# Patient Record
Sex: MALE | Race: BLACK OR AFRICAN AMERICAN | NOT HISPANIC OR LATINO | Employment: PART TIME | URBAN - METROPOLITAN AREA
[De-identification: names, ages, dates, MRNs, and addresses within clinical notes are randomized per-mention and may not be internally consistent; named-entity substitution may affect disease eponyms.]

---
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
## 2022-12-14 DIAGNOSIS — R09.89 DECREASED CARDIAC FUNCTION: ICD-10-CM

## 2022-12-14 DIAGNOSIS — I42.8 LV NON-COMPACTION CARDIOMYOPATHY: Primary | ICD-10-CM

## 2022-12-14 DIAGNOSIS — R94.31 ABNORMAL EKG: ICD-10-CM

## 2023-01-04 ENCOUNTER — OFFICE VISIT (OUTPATIENT)
Dept: PEDIATRIC CARDIOLOGY | Facility: CLINIC | Age: 23
End: 2023-01-04
Payer: MEDICAID

## 2023-01-04 VITALS
HEART RATE: 74 BPM | HEIGHT: 69 IN | RESPIRATION RATE: 16 BRPM | SYSTOLIC BLOOD PRESSURE: 124 MMHG | BODY MASS INDEX: 27.13 KG/M2 | WEIGHT: 183.19 LBS | DIASTOLIC BLOOD PRESSURE: 68 MMHG | OXYGEN SATURATION: 99 %

## 2023-01-04 DIAGNOSIS — R93.1 ABNORMAL ECHOCARDIOGRAM: ICD-10-CM

## 2023-01-04 DIAGNOSIS — Z82.49 FAMILY HISTORY OF CARDIOMYOPATHY: Primary | ICD-10-CM

## 2023-01-04 DIAGNOSIS — Z82.49 FAMILY HISTORY OF AICD (AUTOMATIC INTERNAL CARDIAC DEFIBRILLATOR): ICD-10-CM

## 2023-01-04 DIAGNOSIS — R94.31 ABNORMAL EKG: ICD-10-CM

## 2023-01-04 DIAGNOSIS — I35.1 AORTIC VALVE INSUFFICIENCY, ETIOLOGY OF CARDIAC VALVE DISEASE UNSPECIFIED: ICD-10-CM

## 2023-01-04 DIAGNOSIS — I42.8 LV NON-COMPACTION CARDIOMYOPATHY: ICD-10-CM

## 2023-01-04 DIAGNOSIS — Z91.89 AT HIGH RISK FOR CARDIAC ARRHYTHMIA: ICD-10-CM

## 2023-01-04 DIAGNOSIS — R09.89 DECREASED CARDIAC FUNCTION: ICD-10-CM

## 2023-01-04 PROCEDURE — 93000 EKG 12-LEAD: ICD-10-PCS | Mod: S$GLB,,, | Performed by: PEDIATRICS

## 2023-01-04 PROCEDURE — 99204 OFFICE O/P NEW MOD 45 MIN: CPT | Mod: 25,S$GLB,, | Performed by: PHYSICIAN ASSISTANT

## 2023-01-04 PROCEDURE — 93000 ELECTROCARDIOGRAM COMPLETE: CPT | Mod: S$GLB,,, | Performed by: PEDIATRICS

## 2023-01-04 PROCEDURE — 99204 PR OFFICE/OUTPT VISIT, NEW, LEVL IV, 45-59 MIN: ICD-10-PCS | Mod: 25,S$GLB,, | Performed by: PHYSICIAN ASSISTANT

## 2023-01-04 NOTE — PROGRESS NOTES
[facility]  Daren Silva  2000    Daren Silva is a 23 y.o. 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   Daren is here today unaccompanied.  He is late for follow up. He is considered a new patient for billing purposes.     HPI  Daren Silva reports he was having a physical for work and was noted to have elevated BP so decided he wanted to return for follow up over 5 years late.     Daren Silva was initially sent for cardiac evaluation in January 2014 for abnormal echo findings following a sports physical. Echo revealed LV noncompaction. Of note, his mother has a history dilated cardiomyopathy associated with eccentric hypertrophy and an ejection fraction of 10% per echocardiogram report from 6/25/10. She had ventricular tachycardia s/p defibrillator/pacemaker. She was initially diagnosed in 2008 after fainting in [place]. Since his last visit, his mother has passed away.  His MGM also reported passed from similar issues.     His studies had been evaluated by several cardiologists (Dr. Samano, Dr. Clarke, Dr. Gabreil, Dr. Yuen, Dr. Forde) and all agreed that he has LV non-compaction. At that time, he was living part time with his father in [place] who wanted him to play sports. He was last seen at [facility] in 2016 where is was again restricted from sports. He reports he continued to play football without any issues.     In 2017 he underwent cardiac MRI at [facility] main campus that showed non-compacted to compacted myocardium ratio > 2.3 with multiple crypts consistent with LV non-compaction with LVEF of  40 %. Stress echo at that time showed moderately depressed left ventricular systolic function (EF 30-35%) but no evidence of stress induced myocardial ischemia. He was discussed at the Pediatric CV Surgery and Cardiology Conference. The consensus from the group, pertaining to his  asymptomatic heart failure, is that he should be on an ACE and beta blocker. Also recommended another echo  to revaluate his function, since there was such a discrepancy between his echo, MRI, and stress echo. Recommended no sports or competitive activities.  He saw Dr. Yuen in 2017 who planned to consider ICD for primary prevention. He has not been seen by any cardiologist since 2017. He reports he stopped Enalapril, Atenolol, and ASA around that time.       Daren has been doing well since last visit.  Daren has a lot of energy and does not get short of breath with activity.   Denies any recent illness, surgeries, or hospitalizations.    There are no reports of chest pain, chest pain with exertion, cyanosis, exercise intolerance, dyspnea, fatigue, feeding intolerance, palpitations, syncope, and tachypnea. No other cardiovascular or medical concerns are reported.      Medications:   Medication List with Changes/Refills   Discontinued Medications    ATENOLOL (TENORMIN) 25 MG TABLET    Take 1/4 tablet (6.25mg) by mouth twice a day.    LISINOPRIL 10 MG TAB    Take 1 tablet (10 mg total) by mouth once daily.      Allergies: Review of patient's allergies indicates:  No Known Allergies  Family History   Problem Relation Age of Onset    Early death Mother     Pacemaker/defibrilator Mother     Hypertension Mother     Arrhythmia Mother         V-tach    Heart failure Mother     Stroke Mother     No Known Problems Father     No Known Problems Sister     No Known Problems Brother     No Known Problems Brother     Heart attacks under age 50 Maternal Grandmother          of heart attack at 41    Cancer Maternal Grandfather     No Known Problems Paternal Grandmother     No Known Problems Paternal Grandfather     Anemia Neg Hx     Cardiomyopathy Neg Hx     Clotting disorder Neg Hx     Childhood respiratory disease Neg Hx     Congenital heart disease Neg Hx     Deafness Neg Hx     Long QT syndrome Neg Hx     Premature  birth Neg Hx     Seizures Neg Hx     SIDS Neg Hx      Past Medical History:   Diagnosis Date    Abnormal EKG     LVH is suggested. QTc measured 408msec in V5 and 453 in lead II; Average QTc 430msec & WNL for age. Abnormal infero-lateral T waves noted with diffuse changes in all leads    Chlamydia trachomatis infection 01/15/2020    Decreased cardiac function     Gonorrhea 01/23/2021    Hypertension     LV non-compaction cardiomyopathy      Social History     Social History Narrative    Daren is currently working. Daren likes to chill and play video games.      Past Surgical History:   Procedure Laterality Date    NO PAST SURGERIES       Birth History    Birth     Weight: 2.268 kg (5 lb)    Gestation Age: 37 wks    Days in Hospital: 2.0       There is no immunization history on file for this patient.  Immunizations were reviewed today and if not current, recommend follow up with the PCP for further management.  Past medical history, family history, surgical history, social history updated and reviewed today.     Review of Systems  GENERAL: No fever, chills, fatigability, malaise, or weight loss.  CHEST: Denies SEXTON, cyanosis, wheezing, cough, sputum production, or SOB.  CARDIOVASCULAR: Denies chest pain, palpitations, diaphoresis, SOB, or reduced exercise tolerance.  Endocrine: Denies polyphagia, polydipsia, or polyuria  Skin: Denies rashes or color change  HENT: Negative for congestion, headaches and sore throat.   ABDOMEN: Appetite fine. No weight loss. Denies diarrhea, abdominal pain, nausea, or vomiting.  PERIPHERAL VASCULAR: No edema, varicosities, or cyanosis.  Musculoskeletal: Negative for muscle weakness and stiffness.  NEUROLOGIC: no dizziness, no history of syncope by report, no headache   Psychiatric/Behavioral: Negative for altered mental status. The patient is not nervous/anxious.   Allergic/Immunologic: Negative for environmental allergies.   : dysuria, hematuria, polyuria    Objective:   /68  "(BP Location: Right arm, Patient Position: Sitting, BP Method: X-Large (Manual))   Pulse 74   Resp 16   Ht 5' 9" (1.753 m)   Wt 83.1 kg (183 lb 3.2 oz)   SpO2 99%   BMI 27.05 kg/m²   Body surface area is 2.01 meters squared.  Growth percentile SmartLinks can only be used for patients less than 20 years old.    Physical Exam  GENERAL: Awake, well-developed well-nourished, no apparent distress  HEENT: mucous membranes moist and pink, normocephalic, no cranial or carotid bruits, sclera anicteric  NECK:  no lymphadenopathy  CHEST: Good air movement, clear to auscultation bilaterally  CARDIOVASCULAR: Quiet precordium, regular rate and rhythm, single S1, split S2, normal P2, No S3 or S4, no rubs or gallops. No clicks or rumbles. No cardiomegaly by palpation. No murmur noted. ABDOMEN: Soft, nontender nondistended, no hepatosplenomegaly, no aortic bruits  EXTREMITIES: Warm well perfused, 2+ radial/pedal/femoral pulses, capillary refill 2 seconds, no clubbing, cyanosis, or edema  NEURO: Alert and oriented, cooperative with exam, face symmetric, moves all extremities well.  Skin: pink, turgor WNL  Vitals reviewed     Tests:   Today's EKG interpretation by Dr. Crowell reveals:   NSR  Nonspecific T wave changes  No LVH  Abnormal EKG  (Final report in electronic medical record)    Holter 11/06/2017   Holter: PREDOMINANT RHYTHM  1. Sinus rhythm with heart rates varying between 54 and 173 bpm with an average of 95 bpm.   VENTRICULAR ARRHYTHMIAS  1. There were very rare PVCs recorded totalling 8 and averaging less than 1 per hour.   2. There were no episodes of ventricular tachycardia.  SUPRA VENTRICULAR ARRHYTHMIAS  1. There was no supraventricular ectopic activity.  SINUS NODE FUNCTION  1. There was no evidence of high grade SA kristie block.   AV CONDUCTION  1. There was no evidence of high grade AV block.      Stress ECHO: 9/7/17  CONCLUSIONS     1 - Moderately depressed left ventricular systolic function (EF 30-35%).     2 - " Normal right ventricular systolic function .     3 - Indeterminate LV diastolic function.     4 - Noncompaction cardiomyopathy.   No evidence of stress induced myocardial ischemia.      Cardiac MRI: 9/15/17   Conclusion:    1. The left ventricular volumes are increased. Non-compacted to compacted myocardium ratio > 2.3 with multiple crypts consistent with LV non-compaction. Mild global hypokinesis. The calculated LVEF is 40 %.   2. The right ventricular end diastolic volume is increased.  RVEF of 39 %.   3. Top normal left atrial size.   4. Trivial aortic valve insufficiency, regurgitant fraction of 1%.     5. Negative delayed hyperenhancement.       8/24/17 echo  History of mild LV non-compaction???. There are 4 chambers with normally aligned great vessels. There are no shunts noted. The right coronary artery and left coronary are patent by 2D. Physiological TR, PI. Mildly decreased LV function LVID qualitatively increased with prominent trabeculation in LV apex and lateral wall. LA volume normal FS ~ 30% EF by Magdaleno's ~ 49 , 50% LVEF (Teich) 57% MV E/A 1.7 LV tissue doppler data is normal TAPSE 25 mms Clinical Correlation Suggested Follow Up Warranted     Assessment:  Patient Active Problem List   Diagnosis    LV non-compaction cardiomyopathy    Decreased cardiac function    Abnormal EKG    Family history of AICD (automatic internal cardiac defibrillator)    Family history of cardiomyopathy    Abnormal echocardiogram    At high risk for cardiac arrhythmia    Aortic valve regurgitation     Discussion/ Plan:   Dr. Crowell reviewed history and physical exam. He then performed the physical exam. He discussed the findings with the patient's caregiver(s), and answered all questions. Dr. Crowell and I have reviewed our general guidelines related to cardiac issues with the family.  I instructed them in the event of an emergency to call 911 or go to the nearest emergency room.  They know to contact the PCP if problems arise or  if they are in doubt.    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 AI noted on MRI.  There has been a lapse in care from 2017 when he stopped his cardiac medications. At his visit here he reported he had lost his wallet with his ID and insurance info.  Because of very limited echo availably, unsure of insurance coverage, and since it has been over 5 years since his last echo, Dr. Crowell did a limited echo at no charge   with him at the bedside to check his LVEF to make sure he did not need to be urgently admitted  to the hospital. His LVEF is decreased but stable from previous echo and was about 48%. He wanted to come back Friday for an echo, holter, and genetic testing once he was able to obtain his insurance info and obtain a new ID.  Spoke to him Thursday.  He had insurance through his dad until November reportedly. He was going to call his work and see if he had insurance through his job. He was to call back before Friday to discuss plan. He called back Thursday and said his insurance through work would kick in sometime in Feb 2023. Dr. Crowell was agreeable with pushing back testing until early Feb 2023 as long as he was asymptomatic. Daren said he would be unable to pay the 2000 dollars he was quoted  for his echo through billing department and had talked to the financial assistance through [facility]. He wanted to wait until his insurance was activated. He was to call us let us know what day it was active. Tried to reach him several times Friday morning before is scheduled echo appointment. No answer. Spoke him around 11 am on Friday. Daren and his father are now working for a new company and Daren states he does not have health insurance through work. His dad is calling now to see if he can be added to his insurance. Kiera also suggested applying for medicaid and provided the number. Daren would like to plan for  an echo, holter, apt, and genetic testing Feb 1st 2023. If he is unable to obtain insurance before then he is going to talk to the financial department again about payment plans. He is too old for [facility] unfortunately. Discussed that if he develops any symptoms, he needs to alert us ASAP. If he is in distress, he needs to be seen in the ER. Dr. Crowell had a long discussion about LV noncompaction cardiomyopathy and he needs lifelong monitoring. He will need to see Dr. Johnson and EP and need further testing. Daren expressed understanding. Discussed we will be happy to help in way and Daren will keep us updated.      Activity:NO strenuous activities, no heavy weight lifting, no competitive sports/activities.        No endocarditis prophylaxis is recommended in this circumstance.      Medications:   Medication List with Changes/Refills   Discontinued Medications    ATENOLOL (TENORMIN) 25 MG TABLET    Take 1/4 tablet (6.25mg) by mouth twice a day.    LISINOPRIL 10 MG TAB    Take 1 tablet (10 mg total) by mouth once daily.         Orders placed this encounter  Orders Placed This Encounter   Procedures    3-14 Day Pediatric Holter Monitor    EKG 12-lead    Pediatric Echo Limited Echo? No       Follow-Up:   Return to clinic Feb 1, 2023 or sooner if there are any concerns    Sincerely,  Sesar Crowell MD    Note Contributing Authors:  MD Swathi Urbina PA-C  01/06/2023    Attestation: Sesar Crowell MD  I have reviewed the records and agree with the above. I have examined the patient and discussed the findings with the family in attendance. All questions were answered to their satisfaction. I agree with the plan and the follow up instructions.

## 2023-01-04 NOTE — LETTER
January 6, 2023        Tom Blevins MD  [address] [facility] [id number] [place] [address] Cardiology  [address] [facility] [address]  Phone: [phone]  Fax: [phone]   Patient: Daren Silva   MR Number: [id number]   YOB: 2000   Date of Visit: 1/4/2023       Dear Dr. Blevins:    Thank you for referring Daren Silva to me for evaluation. Attached you will find relevant portions of my assessment and plan of care.    If you have questions, please do not hesitate to call me. I look forward to following Daren Silva along with you.    Sincerely,      Swathi Johnson PA-C            CC    No Recipients    Enclosure

## 2023-01-04 NOTE — PATIENT INSTRUCTIONS
Sesar Crowell MD  Pediatric Cardiology  [address] [place] [id number]  [phone]    General Guidelines    Name: Daren Silva                   : 2000    Diagnosis:   1. LV non-compaction cardiomyopathy    2. Decreased cardiac function    3. Abnormal EKG        PCP: Tom Blevins MD  PCP Phone Number: [phone]    If you have an emergency or you think you have an emergency, go to the nearest emergency room!     Breathing too fast, doesnt look right, consistently not eating well, your child needs to be checked. These are general indications that your child is not feeling well. This may be caused by anything, a stomach virus, an ear ache or heart disease, so please call Tom Blevins MD. If Tom Blevins MD thinks you need to be checked for your heart, they will let us know.     If your child experiences a rapid or very slow heart rate and has the following symptoms, call Tom Blevins MD or go to the nearest emergency room.   unexplained chest pain   does not look right   feels like they are going to pass out   actually passes out for unexplained reasons   weakness or fatigue   shortness of breath  or breathing fast   consistent poor feeding     If your child experiences a rapid or very slow heart rate that lasts longer than 30 minutes call Tom Blevins MD or go to the nearest emergency room.     If your child feels like they are going to pass out - have them sit down or lay down immediately. Raise the feet above the head (prop the feet on a chair or the wall) until the feeling passes. Slowly allow the child to sit, then stand. If the feeling returns, lay back down and start over.     It is very important that you notify Tom Blevins MD first. Tom Blevins MD or the ER Physician can reach Dr. Sesar Crowell at the office or through [facility] PICU at [phone] as needed.    Call our office ([phone]) one week after ALL tests for results.

## 2023-01-06 PROBLEM — R93.1 ABNORMAL ECHOCARDIOGRAM: Status: ACTIVE | Noted: 2023-01-06

## 2023-01-06 PROBLEM — Z91.89 AT HIGH RISK FOR CARDIAC ARRHYTHMIA: Status: ACTIVE | Noted: 2023-01-06

## 2023-01-06 PROBLEM — Z82.49 FAMILY HISTORY OF CARDIOMYOPATHY: Status: ACTIVE | Noted: 2023-01-06

## 2023-01-06 PROBLEM — I35.1 AORTIC VALVE REGURGITATION: Status: ACTIVE | Noted: 2023-01-06

## 2023-01-06 PROBLEM — Z82.49 FAMILY HISTORY OF AICD (AUTOMATIC INTERNAL CARDIAC DEFIBRILLATOR): Status: ACTIVE | Noted: 2023-01-06

## 2023-02-02 ENCOUNTER — TELEPHONE (OUTPATIENT)
Dept: PEDIATRIC CARDIOLOGY | Facility: CLINIC | Age: 23
End: 2023-02-02
Payer: MEDICAID

## 2023-02-02 NOTE — LETTER
[facility] Cardiology  [address] [facility] [address]  Phone: [phone]  Fax: [phone]       Date: 2023    Re: Daren Silva  : 2000      To the guardian of Daren Silva:    We are sorry that Daren missed his appointment for an echocardiogram and follow up on 2023. [facility] and follow-up medical care are important to us. Please call our office as soon as possible at [phone] so that we may reschedule his appointment and update your contact information. If you have already rescheduled Daren's appointment, please disregard this letter.    Sincerely,    Sesar Crowell MD and staff

## 2023-02-02 NOTE — TELEPHONE ENCOUNTER
----- Message from Karina Woo RN sent at 2/1/2023  4:24 PM CST -----  Regarding: NS'd 02/01/2023- TDK AND ECHO  Okay to RS for a f/u with TDK within the next 2-6 weeks (he was waiting on his new insurance to start on 02/01/2023). If no answer, please mail a letter to the address on file asking him to call and RS the missed appt.     Thank you

## 2023-06-01 ENCOUNTER — TELEPHONE (OUTPATIENT)
Dept: PEDIATRIC CARDIOLOGY | Facility: CLINIC | Age: 23
End: 2023-06-01

## 2023-06-01 NOTE — TELEPHONE ENCOUNTER
----- Message from Karina Woo RN sent at 6/1/2023  3:58 PM CDT -----  Regarding: NS'd 06/01/2023- TDK AND ECHO  Okay to RS to first available appt. Needs both an echo and follow up with TDK but okay to split up appointments. If no answer, please mail a letter to the address on file asking the family to call and RS the missed appt.    Thank you

## 2023-06-01 NOTE — LETTER
[facility] Cardiology  [address] [facility] [place] [address]  Phone: [phone]  Fax: [phone]       Date: 2023    Re: Daren Silva  : 2000      To the guardian of Daren Silva:    We are sorry that Daren missed his appointment for a follow up and echocardiogram on 2023. [facility] and follow-up medical care are important to us. Please call our office as soon as possible at [phone] so that we may reschedule his appointment and update your contact information. If you have already rescheduled Daren's appointment, please disregard this letter.    Sincerely,    Sesar Crowell MD and staff

## 2024-01-12 DIAGNOSIS — R09.89 DECREASED CARDIAC FUNCTION: ICD-10-CM

## 2024-01-12 DIAGNOSIS — R94.31 ABNORMAL EKG: ICD-10-CM

## 2024-01-12 DIAGNOSIS — I42.8 LV NON-COMPACTION CARDIOMYOPATHY: Primary | ICD-10-CM

## 2024-01-23 ENCOUNTER — CLINICAL SUPPORT (OUTPATIENT)
Dept: PEDIATRIC CARDIOLOGY | Facility: CLINIC | Age: 24
End: 2024-01-23
Attending: PHYSICIAN ASSISTANT
Payer: MEDICAID

## 2024-01-23 VITALS
HEART RATE: 51 BPM | RESPIRATION RATE: 18 BRPM | BODY MASS INDEX: 30.02 KG/M2 | OXYGEN SATURATION: 98 % | WEIGHT: 198.06 LBS | SYSTOLIC BLOOD PRESSURE: 124 MMHG | DIASTOLIC BLOOD PRESSURE: 78 MMHG | HEIGHT: 68 IN

## 2024-01-23 DIAGNOSIS — I42.8 LV NON-COMPACTION CARDIOMYOPATHY: ICD-10-CM

## 2024-01-23 DIAGNOSIS — Z82.49 FAMILY HISTORY OF VENTRICULAR TACHYCARDIA: ICD-10-CM

## 2024-01-23 DIAGNOSIS — R09.89 DECREASED CARDIAC FUNCTION: ICD-10-CM

## 2024-01-23 DIAGNOSIS — R94.31 ABNORMAL EKG: ICD-10-CM

## 2024-01-23 DIAGNOSIS — Z82.49 FAMILY HISTORY OF AICD (AUTOMATIC INTERNAL CARDIAC DEFIBRILLATOR): ICD-10-CM

## 2024-01-23 DIAGNOSIS — Z82.49 FAMILY HISTORY OF CARDIOMYOPATHY: ICD-10-CM

## 2024-01-23 DIAGNOSIS — I50.89 OTHER HEART FAILURE: Primary | ICD-10-CM

## 2024-01-23 DIAGNOSIS — Z91.89 AT HIGH RISK FOR CARDIAC ARRHYTHMIA: ICD-10-CM

## 2024-01-23 DIAGNOSIS — I35.1 AORTIC VALVE INSUFFICIENCY, ETIOLOGY OF CARDIAC VALVE DISEASE UNSPECIFIED: ICD-10-CM

## 2024-01-23 DIAGNOSIS — R93.1 ABNORMAL ECHOCARDIOGRAM: ICD-10-CM

## 2024-01-23 PROCEDURE — 3078F DIAST BP <80 MM HG: CPT | Mod: CPTII,S$GLB,, | Performed by: PHYSICIAN ASSISTANT

## 2024-01-23 PROCEDURE — 1159F MED LIST DOCD IN RCRD: CPT | Mod: CPTII,S$GLB,, | Performed by: PHYSICIAN ASSISTANT

## 2024-01-23 PROCEDURE — 3074F SYST BP LT 130 MM HG: CPT | Mod: CPTII,S$GLB,, | Performed by: PHYSICIAN ASSISTANT

## 2024-01-23 PROCEDURE — 93000 ELECTROCARDIOGRAM COMPLETE: CPT | Mod: S$GLB,,, | Performed by: PEDIATRICS

## 2024-01-23 PROCEDURE — 99215 OFFICE O/P EST HI 40 MIN: CPT | Mod: 25,S$GLB,, | Performed by: PHYSICIAN ASSISTANT

## 2024-01-23 PROCEDURE — 3008F BODY MASS INDEX DOCD: CPT | Mod: CPTII,S$GLB,, | Performed by: PHYSICIAN ASSISTANT

## 2024-01-23 PROCEDURE — 93242 EXT ECG>48HR<7D RECORDING: CPT | Mod: ,,, | Performed by: PEDIATRICS

## 2024-01-23 PROCEDURE — 93244 EXT ECG>48HR<7D REV&INTERPJ: CPT | Mod: ,,, | Performed by: PEDIATRICS

## 2024-01-23 PROCEDURE — 1160F RVW MEDS BY RX/DR IN RCRD: CPT | Mod: CPTII,S$GLB,, | Performed by: PHYSICIAN ASSISTANT

## 2024-01-23 RX ORDER — NAPROXEN SODIUM 220 MG/1
81 TABLET, FILM COATED ORAL DAILY
Qty: 90 TABLET | Refills: 3 | Status: SHIPPED | OUTPATIENT
Start: 2024-01-23 | End: 2025-01-22

## 2024-01-23 RX ORDER — ENALAPRIL MALEATE 5 MG/1
5 TABLET ORAL DAILY
Qty: 90 TABLET | Refills: 3 | Status: SHIPPED | OUTPATIENT
Start: 2024-01-23 | End: 2024-04-08

## 2024-01-23 NOTE — PROGRESS NOTES
[facility]  Daren Silva  2000  [id number]      Daren Silva is a 24 y.o. male presenting for follow-up of    LV non-compaction cardiomyopathy    Decreased cardiac function    Abnormal EKG    Family history of AICD (automatic internal cardiac defibrillator)    Family history of cardiomyopathy    Abnormal echocardiogram    At high risk for cardiac arrhythmia    Aortic valve regurgitation     HPI  Daren Silva was initially sent for cardiac evaluation in January 2014 for abnormal echo findings following a sports physical. Echo revealed LV noncompaction. Of note, his mother has a history dilated cardiomyopathy associated with eccentric hypertrophy and an ejection fraction of 10% per echocardiogram report from 6/25/10. She had ventricular tachycardia s/p defibrillator/pacemaker. She was initially diagnosed in 2008 after fainting in [place]. his mother has since passed away.  His MGM also reported passed from similar issues. He reports his maternal aunt was recently diagnosed with heart failure.      His studies had been evaluated by several cardiologists (Dr. Samano, Dr. Clarke, Dr. Gabriel, Dr. Yuen, Dr. Forde) and all agreed that he has LV non-compaction. At that time, he was living part time with his father in [place] who wanted him to play sports. He was last seen at [facility] in 2016 where is was again restricted from sports. He reports he continued to play football without any issues.      In 2017 he underwent cardiac MRI at [facility] main campus that showed non-compacted to compacted myocardium ratio > 2.3 with multiple crypts consistent with LV non-compaction with LVEF of  40 %. Stress echo at that time showed moderately depressed left ventricular systolic function (EF 30-35%) but no evidence of stress induced myocardial ischemia. He was discussed at the Pediatric CV Surgery and Cardiology Conference. The consensus from the group, pertaining to his asymptomatic heart  failure, is that he should be on an ACE and beta blocker. Also recommended another echo  to revaluate his function, since there was such a discrepancy between his echo, MRI, and stress echo. Recommended no sports or competitive activities.  He saw Dr. Yuen in 2017 who planned to consider ICD for primary prevention. He has not been seen by any cardiologist since 2017. He reports he stopped Enalapril, Atenolol, and ASA around that time.     He stopped his cardiac medications on his own in 2017. There was a lapse in care from 2017 until 2023 when he returned after noted to have high blood pressure at work physical. At his visit here he reported he had lost his wallet with his ID and insurance info.  Because of very limited echo availably, unsure of insurance coverage, and since it has been over 5 years since his last echo, Dr. Crowell did a limited echo at no charge  with him at the bedside to check his LVEF to make sure he did not need to be urgently admitted  to the hospital. His LVEF was decreased but stable from previous echo and was about 48%. He wanted to come back in a few days for an echo, holter, and genetic testing once he was able to obtain his insurance info and obtain a new ID. However, he never returned until today.       Daren has been doing well since last visit. Daren says he fatigues with actives such as playing  basketball or going to the gym but attributes this to being out of shape. He states this is unchanged since 2020. 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 He does not lift anything heavy or do anything strenuous at work per Daren. Denies any recent illness, surgeries, or hospitalizations.    There are no reports of chest pain, chest pain with exertion, cyanosis, exercise intolerance, dyspnea, palpitations, syncope, and tachypnea. No other cardiovascular or medical concerns are reported.      Medications:    Current Outpatient Medications   Medication  Sig    aspirin 81 MG Chew Take 1 tablet (81 mg total) by mouth once daily.    enalapril (VASOTEC) 5 MG tablet Take 1 tablet (5 mg total) by mouth once daily.     No current facility-administered medications for this visit.       Allergies: Review of patient's allergies indicates:  No Known Allergies  Family History   Problem Relation Age of Onset    Cardiomyopathy Mother         dilated?    Early death Mother     Pacemaker/defibrilator Mother     Hypertension Mother     Arrhythmia Mother         V-tach    Heart failure Mother     Stroke Mother     No Known Problems Father     No Known Problems Sister     No Known Problems Brother     No Known Problems Brother     Cardiomyopathy Maternal Aunt         possible?    Heart failure Maternal Aunt     Heart failure Maternal Grandmother     Cardiomyopathy Maternal Grandmother     Heart attacks under age 50 Maternal Grandmother          of heart attack at 41    Cancer Maternal Grandfather     No Known Problems Paternal Grandmother     No Known Problems Paternal Grandfather     Anemia Neg Hx     Clotting disorder Neg Hx     Childhood respiratory disease Neg Hx     Congenital heart disease Neg Hx     Deafness Neg Hx     Long QT syndrome Neg Hx     Premature birth Neg Hx     Seizures Neg Hx     SIDS Neg Hx      Past Medical History:   Diagnosis Date    Abnormal EKG     Aortic insufficiency     Chlamydia trachomatis infection 01/15/2020    Decreased cardiac function     Family history of AICD (automatic internal cardiac defibrillator)     Mother: ventricular tachycardia s/p defibrillator/pacemaker    Family history of cardiomyopathy     Mother: history dilated cardiomyopathy associated with eccentric hypertrophy and an ejection fraction of 10% per echo    Gonorrhea 2021    LV non-compaction cardiomyopathy      Social History     Social History Narrative    Daren is currently working. Daren likes to chill and play video games.      Past Surgical History:   Procedure  "Laterality Date    NO PAST SURGERIES       Birth History    Birth     Weight: 2.268 kg (5 lb)    Gestation Age: 37 wks    Days in Hospital: 2.0       There is no immunization history on file for this patient.  Immunizations were reviewed today and if not current, recommend follow up with the PCP for further management.  Past medical history, family history, surgical history, social history updated and reviewed today.     Review of Systems  GENERAL: No fever, chills, fatigability, malaise, or weight loss.  CHEST: Denies SEXTON, cyanosis, wheezing, cough, sputum production, or SOB.  CARDIOVASCULAR: Denies chest pain, palpitations, diaphoresis, SOB, or reduced exercise tolerance.  Endocrine: Denies polyphagia, polydipsia, or polyuria  Skin: Denies rashes or color change  HENT: Negative for congestion, headaches and sore throat.   ABDOMEN: Appetite fine. No weight loss. Denies diarrhea, abdominal pain, nausea, or vomiting.  PERIPHERAL VASCULAR: No edema, varicosities, or cyanosis.  Musculoskeletal: Negative for muscle weakness and stiffness.  NEUROLOGIC: no dizziness, no history of syncope by report, no headache   Psychiatric/Behavioral: Negative for altered mental status. The patient is not nervous/anxious.   Allergic/Immunologic: Negative for environmental allergies.   : dysuria, hematuria, polyuria    Objective:   /78 (BP Location: Right arm, Patient Position: Sitting, BP Method: Large (Manual))   Pulse (!) 51   Resp 18   Ht 5' 8.31" (1.735 m)   Wt 89.8 kg (198 lb 1.3 oz)   SpO2 98%   BMI 29.85 kg/m²   Body surface area is 2.08 meters squared.  Growth %ile SmartLinks can only be used for patients less than 20 years old.    Physical Exam  GENERAL: Awake, well-developed well-nourished, no apparent distress  HEENT: mucous membranes moist and pink, normocephalic, no cranial or carotid bruits, sclera anicteric  NECK:  no lymphadenopathy  CHEST: Good air movement, clear to auscultation " bilaterally  CARDIOVASCULAR: Quiet precordium, regular rate and rhythm, single S1, split S2, normal P2, No S3 or S4, no rubs or gallops. No clicks or rumbles. No cardiomegaly by palpation. No murmur noted.   ABDOMEN: Soft, nontender nondistended, no hepatosplenomegaly, no aortic bruits  EXTREMITIES: Warm well perfused, 2+ radial/pedal/femoral pulses, capillary refill 2 seconds, no clubbing, cyanosis, or edema  NEURO: Alert and oriented, cooperative with exam, face symmetric, moves all extremities well.  Skin: pink, turgor WNL, minimal acanthosis nigricans   Vitals reviewed     Tests:   Today's EKG interpretation by Dr. Crowell reveals:   SB  (Final report in electronic medical record)    Holter 11/06/2017   Holter: PREDOMINANT RHYTHM  1. Sinus rhythm with heart rates varying between 54 and 173 bpm with an average of 95 bpm.   VENTRICULAR ARRHYTHMIAS  1. There were very rare PVCs recorded totalling 8 and averaging less than 1 per hour.   2. There were no episodes of ventricular tachycardia.  SUPRA VENTRICULAR ARRHYTHMIAS  1. There was no supraventricular ectopic activity.  SINUS NODE FUNCTION  1. There was no evidence of high grade SA kristie block.   AV CONDUCTION  1. There was no evidence of high grade AV block.      Stress ECHO: 9/7/17  CONCLUSIONS     1 - Moderately depressed left ventricular systolic function (EF 30-35%).     2 - Normal right ventricular systolic function .     3 - Indeterminate LV diastolic function.     4 - Noncompaction cardiomyopathy.   No evidence of stress induced myocardial ischemia.      Cardiac MRI: 9/15/17   Conclusion:    1. The left ventricular volumes are increased. Non-compacted to compacted myocardium ratio > 2.3 with multiple crypts consistent with LV non-compaction. Mild global hypokinesis. The calculated LVEF is 40 %.   2. The right ventricular end diastolic volume is increased.  RVEF of 39 %.   3. Top normal left atrial size.   4. Trivial aortic valve insufficiency, regurgitant  fraction of 1%.     5. Negative delayed hyperenhancement.       8/24/17 echo  History of mild LV non-compaction???. There are 4 chambers with normally aligned great vessels. There are no shunts noted. The right coronary artery and left coronary are patent by 2D. Physiological TR, PI. Mildly decreased LV function LVID qualitatively increased with prominent trabeculation in LV apex and lateral wall. LA volume normal FS ~ 30% EF by Magdaleno's ~ 49 , 50% LVEF (Teich) 57% MV E/A 1.7 LV tissue doppler data is normal TAPSE 25 mms Clinical Correlation Suggested Follow Up Warranted        Assessment:  Patient Active Problem List   Diagnosis    LV non-compaction cardiomyopathy    Decreased cardiac function    Abnormal EKG    Family history of AICD (automatic internal cardiac defibrillator)    Family history of cardiomyopathy    Abnormal echocardiogram    At high risk for cardiac arrhythmia    Aortic valve regurgitation    Other heart failure    Family history of ventricular tachycardia       Discussion/ Plan:   Dr. Crowell reviewed history and physical exam. He then performed the physical exam. He discussed the findings with the patient's caregiver(s), and answered all questions. Dr. Crowell and I have reviewed our general guidelines related to cardiac issues with the family.  I instructed them in the event of an emergency to call 911 or go to the nearest emergency room.  They know to contact the PCP if problems arise or if they are in doubt.    Daren is followed for  LV noncompaction cardiomyopathy with moderate to severely depressed LV systolic dysfunction but no overt symptoms at present. AI noted on MRI. There has been a lapse in care from 2017 when he stopped his cardiac medications. He returned in 2023 but did not complete any testing or return for follow up. He now has insurance and is agreeable with maintaining appropriate follow up and taking his medications.  Dr. Crowell discussed with Daren that he would not participate  in his care if he continued to be noncompliant. Preliminary report of his echo today showed his LVEF was 43 %. Final results pending. Dr. Crowell would like to restart his Enalapril 5 mg daily and 81 mg ASA. Will consider restarting low dose tenormin at follow up visit. He will have a holter today. Dr. Crowell would like to order the [facility] cardiomyopathy and arrhythmia panel. We will also see Daren's son in the near future. His sister lives in [place] so she will touch base with her PCP about being seen. Dr. Crowell discussed that he will need to see Dr. Mitchell. He may also need to see Dr. Rodriguez in the future.  Daren expressed understanding. Discussed signs and symptoms to alert us about. He should go to the ER if there are any signs of distress.    I spent a total of 40 minutes on the day of the visit.  This includes face to face time and non-face to face time preparing to see the patient (eg, review of tests), obtaining and/or reviewing separately obtained history, documenting clinical information in the electronic or other health record, independently interpreting results and communicating results to the patient/family/caregiver, or care coordinator.      Activity:NO strenuous activities, no heavy weight lifting, no competitive sports/activities.      Selective endocarditis prophylaxis is recommended in this circumstance.      Medications:    Current Outpatient Medications   Medication Sig    aspirin 81 MG Chew Take 1 tablet (81 mg total) by mouth once daily.    enalapril (VASOTEC) 5 MG tablet Take 1 tablet (5 mg total) by mouth once daily.     No current facility-administered medications for this visit.        Orders placed this encounter  Orders Placed This Encounter   Procedures    3-14 Day Pediatric Holter Monitor    EKG 12-lead    [facility] cardiomyopathy and arrhythmia panel    Follow-Up:   Return to clinic in 1 month with EKG pending testing or sooner if there are any concerns    Sincerely,  Sesar Crowell,  MD    Note Contributing Authors:  MD Swathi Urbina PA-C  01/23/2024    Attestation: Sesar Crowell MD  I have reviewed the records and agree with the above. I have examined the patient and discussed the findings with the family in attendance. All questions were answered to their satisfaction. I agree with the plan and the follow up instructions.

## 2024-01-23 NOTE — PATIENT INSTRUCTIONS
Sesar Crowell MD  Pediatric Cardiology  [address] [place] [address]  [phone]    General Guidelines    Name: Daren Silva                   : 2000    Diagnosis:   1. Other heart failure    2. LV non-compaction cardiomyopathy    3. Decreased cardiac function    4. Abnormal EKG    5. Aortic valve insufficiency, etiology of cardiac valve disease unspecified    6. At high risk for cardiac arrhythmia    7. Abnormal echocardiogram    8. Family history of cardiomyopathy    9. Family history of AICD (automatic internal cardiac defibrillator)        PCP: Tom Blevins MD  PCP Phone Number: [phone]    If you have an emergency or you think you have an emergency, go to the nearest emergency room!     Breathing too fast, doesnt look right, consistently not eating well, your child needs to be checked. These are general indications that your child is not feeling well. This may be caused by anything, a stomach virus, an ear ache or heart disease, so please call Tom Blevins MD. If Tom Blevins MD thinks you need to be checked for your heart, they will let us know.     If your child experiences a rapid or very slow heart rate and has the following symptoms, call Tom Blevins MD or go to the nearest emergency room.   unexplained chest pain   does not look right   feels like they are going to pass out   actually passes out for unexplained reasons   weakness or fatigue   shortness of breath  or breathing fast   consistent poor feeding     If your child experiences a rapid or very slow heart rate that lasts longer than 30 minutes call Tom Blevins MD or go to the nearest emergency room.     If your child feels like they are going to pass out - have them sit down or lay down immediately. Raise the feet above the head (prop the feet on a chair or the wall) until the feeling passes. Slowly allow the child to sit, then stand. If the feeling returns, lay back down and  start over.     It is very important that you notify Tom Blevins MD first. Tom Blevins MD or the ER Physician can reach Dr. Sesar Crowell at the office or through [facility] at [phone] as needed.    Call our office ([phone]) one week after ALL tests for results.     PREVENTION OF BACTERIAL ENDOCARDITIS (selective IE)    A COPY OF THIS SHEET MUST BE GIVEN TO ALL OF YOUR DOCTORS OR HEALTH CARE PROVIDERS    You have received this information because you are at an increased risk for developing adverse outcomes from infective endocarditis (IE), also known as subacute bacterial endocarditis (SBE).    Patient Name:  Daren Silva    : 2000   Diagnosis:   1. Other heart failure    2. LV non-compaction cardiomyopathy    3. Decreased cardiac function    4. Abnormal EKG    5. Aortic valve insufficiency, etiology of cardiac valve disease unspecified    6. At high risk for cardiac arrhythmia    7. Abnormal echocardiogram    8. Family history of cardiomyopathy    9. Family history of AICD (automatic internal cardiac defibrillator)        As of 2024, Sesar Crowell MD, Pediatric Cardiologist recommends that Daren receive SELECTIVE USE of antibiotic prophylaxis from bacterial endocarditis.    Antibiotic prophylaxis with dental or surgical procedures is recommended in selected instances if your dentist, surgeon or physician believes there is a greater risk of infection.  For example:  1) Any significantly infected operative field (Example: dental abscess or ruptured appendix) which may increase the bacterial load to the blood stream during the procedure; 2) Benefits of antibiotic coverage should be weighed against risk of allergic reactions and anaphylaxis; therefore, their use should be carefully selected based on individual cases.     Antibiotic prophylaxis is NOT recommended for the following dental procedures or events: routine anesthetic injections through non-infected  tissue; taking dental radiographs; placement of removable prosthodontic or orthodontic appliances; adjustment of orthodontic appliances; placement of orthodontic brackets; and shedding of deciduous teeth or bleeding from trauma to the lips or oral mucosa.   If recommended by the Health Care Provider - Antibiotic Prophylactic Regimens   Regimen - Single Dose 30-60 minutes before Procedure  Situation Agent Adults Children   Oral Amoxicillin 2g 50/mg/kg   Unable to take oral meds Ampicillin   OR  Cefazolin or ceftriaxone 2 g IM or IV1    1 g IM or IV 50 mg/kg IM or IV    50 mg/kg IM or IV   Allergic to Penicillins or ampicillin-Oral regimen Cephalexin 2  OR  Clindamycin  OR  Azithromycin or clarithromycin 2 g    600 mg    500 mg 50 mg/kg    20 mg/kg    15 mg/kg   Allergic to penicillin or ampicillin and unable to take oral medications Cefazolin or ceftriaxone 3  OR  Clindamycin 1 g IM or IV    600 mg IM or IV 50 mg/kg IM or IV    20 mg/kg IM or IV   1IM - intramuscular; IV - intravenous  2Or other first or second generation oral cephalosporin in equivalent adult or pediatric dosage.  3Cephalosporins should not be used in an individual with a history of anaphylaxis, angioedema or urticaria with penicillin or ampicillin.   Adapted from Prevention of Infective Endocarditis: Guidelines From the American Heart Association, by the Committee on Rheumatic Fever, Endocarditis, and Kawasaki Disease. Circulation, e-published April 19, 2007. Go to www.americanheart.org/presenter for more information.    The practice of giving patients antibiotics prior to a dental procedure is no longer recommended EXCEPT for patients with the highest risk of adverse outcomes resulting from bacterial endocarditis. We cannot exclude the possibility that an exceedingly small number of cases, if any, of bacterial endocarditis may be prevented by antibiotic prophylaxis prior to a dental procedure. The importance of good oral and dental health and  regular visits to the dentist is important for patients at risk for bacterial endocarditis.  Gastrointestinal (GI)/Genitourinary () Procedures: Antibiotic prophylaxis solely to prevent bacterial endocarditis is no longer recommended for patients who undergo a GI or  tract procedures, including patients with the highest risk of adverse outcomes due to bacterial endocarditis.    Good dental health and hygiene is very effective in preventing bacterial endocarditis.   Always practice good dental health!

## 2024-01-25 ENCOUNTER — DOCUMENTATION ONLY (OUTPATIENT)
Dept: PEDIATRIC CARDIOLOGY | Facility: CLINIC | Age: 24
End: 2024-01-25
Payer: MEDICAID

## 2024-01-25 NOTE — PROGRESS NOTES
There are 4 chambers with normally aligned great vessels. Chamber sizes are qualitatively normal. There are no shunts noted. There is no organic obstruction noted. Physiological TR, PI, MR. The right coronary artery and left coronary are patent by 2D. Reduced left ventricular systolic function, MM FS 26.3%, Magdaleno's biplane EF 44%. Chin method of measuring trabeculations positive for LNNC (0.3, positive is ratio < 0.5)) LA volume 22 ml/m2, normal size LV lateral tissue doppler WNL TAPSE 2.4 cm Descending aorta 7 mmHg Refer to Dr. Fan Mitchell Follow up recommended Clinical correlation suggested.     Dr. Crowell reviewed echo by phone with Dr. Mitchell. Dr. Mitchell is agreeable with seeing him.     Message  Received: Today  Swathi Johnson, [facility] Staff  Good morning,    Dr. Crowell wanted Dr. Mitchell to see Daren Silva. He has LV non compaction with decreased LVEF.  Do you mind helping to schedule? Thanks!

## 2024-01-26 ENCOUNTER — TELEPHONE (OUTPATIENT)
Dept: CARDIOLOGY | Facility: CLINIC | Age: 24
End: 2024-01-26
Payer: MEDICAID

## 2024-02-07 ENCOUNTER — TELEPHONE (OUTPATIENT)
Dept: CARDIOLOGY | Facility: CLINIC | Age: 24
End: 2024-02-07
Payer: MEDICAID

## 2024-02-07 NOTE — TELEPHONE ENCOUNTER
"Unable to leave a voicemail on number [phone] continues to get a "busy" signal. Was able to leave a message including callback name and number for number [phone]. Attempt to schedule virtual visit with Dr. Mitchell. No MY chart set up at this time.     "

## 2024-02-13 ENCOUNTER — TELEPHONE (OUTPATIENT)
Dept: PEDIATRIC CARDIOLOGY | Facility: CLINIC | Age: 24
End: 2024-02-13
Payer: MEDICAID

## 2024-02-13 NOTE — TELEPHONE ENCOUNTER
"Phoned secondary number in chart. Spoke with aunt. She advised it was received. It has already been done and mailed back.    ----- Message from Swathi Johnson PA-C sent at 2/13/2024  1:50 PM CST -----  Je sent a message letting me know the new kit was delivered 2/8/24 at 1. They wanted to "ensure kit was received and any intentions on when it will be sent back". Please call see if he got it. Please remind him  to remove the mouthpiece and screw on the appropriate top per the directions. thanks    "

## 2024-02-15 LAB
OHS CV EVENT MONITOR DAY: 7
OHS CV HOLTER HOOKUP DATE: NORMAL
OHS CV HOLTER HOOKUP TIME: NORMAL
OHS CV HOLTER LENGTH DECIMAL HOURS: 168
OHS CV HOLTER LENGTH HOURS: 0
OHS CV HOLTER LENGTH MINUTES: 0
OHS CV HOLTER SCAN DATE: NORMAL
OHS CV HOLTER SINUS AVERAGE HR: 75 BPM
OHS CV HOLTER SINUS MAX HR: 173 BPM
OHS CV HOLTER SINUS MIN HR: 42 BPM
OHS CV HOLTER STUDY END DATE: NORMAL
OHS CV HOLTER STUDY END TIME: NORMAL

## 2024-02-26 ENCOUNTER — TELEPHONE (OUTPATIENT)
Dept: PEDIATRIC CARDIOLOGY | Facility: CLINIC | Age: 24
End: 2024-02-26

## 2024-02-26 NOTE — TELEPHONE ENCOUNTER
He missed his Dr. Mitchell appointment today. Spoke to aunt and provided number to call and r/s. Also discussed he forgot to label the genetic testing so they could not process it. They are sending new kit to the home.

## 2024-03-08 ENCOUNTER — DOCUMENTATION ONLY (OUTPATIENT)
Dept: PEDIATRIC CARDIOLOGY | Facility: CLINIC | Age: 24
End: 2024-03-08
Payer: MEDICAID

## 2024-03-08 NOTE — PROGRESS NOTES
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 The LAMA4 gene is associated with dilated cardiomyopathy ([employer] UID: 810612). Not all variants present in a gene cause disease. The clinical significance of the variant(s) identified in this gene is uncertain. Until this uncertainty can be resolved, caution should be exercised before using this result to inform clinical management decisions. Complimentary familial VUS testing is not offered.       A Variant of Uncertain Significance, c.5803G>A (p.[id number]), was identified in SCN5A. The SCN5A gene is associated with autosomal dominant Brugada syndrome (BrS) ([employer] UID: 685146), long QT syndrome (LQTS), type 3 (MedGen UID: 438387), dilated cardiomyopathy (DCM) (MedGen UID: 007493) and atrial fibrillation (MedGen UID: 334312) and more severe, early-onset autosomal recessive conditions ([employer] UID: 489362, PMID: 56380929, 97461651, 02873097, 88818697, 93460357). Other [id number] conditions have been reported (OMIM: 514795). Not all variants present in a gene cause disease. The clinical significance of the variant(s) identified in this gene is uncertain. Until this uncertainty can be resolved, caution should be exercised before using this result to inform clinical management decisions. This variant qualifies for complimentary family studies as part of our VUS Resolution Program. Familial VUS testing is recommended if informative family members are available and are likely to provide additional evidence for future variant reclassification. Details on our VUS Resolution Program can be found at [email or web address].    Testing of up to two family members for the Variant(s) of Uncertain Significance (VUS) identified in SCN5A is available at no additional cost.      His mother has a history dilated cardiomyopathy associated with eccentric hypertrophy and an ejection fraction of 10% per echocardiogram report from  6/25/10. She had ventricular tachycardia s/p defibrillator/pacemaker. She was initially diagnosed in 2008 after fainting in [place]. his mother has since passed away.  His MGM also reported passed from similar issues. He reports his maternal aunt was recently diagnosed with heart failure.     Both of his VUS are assoicated with dilated cardiomyopathy. Only the VUS in SCN5A is available for familial testing at no cost in two family members.     Reviewed with Dr. Crowell. Will discuss with Daren about genetic testing in aunt and son. Tried to call Daren. No answer. His aunt will have him call me back.       Addendum 3/12/2024: spoke to Daren and discussed genetic testing results and plan for testing in son and maternal aunt. He will have his son's mother call and schedule an appointment and echo here. Will do genetic testing for the VUS when they call.  Spoke to Daren's maternal aunt (mom's sister). She was recently diagnosed with CHF.  Genetic testing for the VUS ordered. Discussed that she will need continued follow up with cardiologist. If her testing comes back as negative, she still has a risk of developing cardiomyopathy and should be followed by her cardiologist. She expressed understanding.

## 2024-03-08 NOTE — TELEPHONE ENCOUNTER
----- Message from Salome Bailey sent at 2/26/2018 10:15 AM CST -----  No answer. Left voicemail to call us back    ----- Message -----  From: Maame De Oliveira PA-C  Sent: 2/26/2018   9:52 AM  To: Salome Bailey    Please call mom and let her know he missed an echo and needs to get back on the schedule. He also needs appt with Jammie Yuen and Stephen so please schedule those if available.      yes

## 2024-03-12 ENCOUNTER — TELEPHONE (OUTPATIENT)
Dept: PEDIATRIC CARDIOLOGY | Facility: CLINIC | Age: 24
End: 2024-03-12
Payer: MEDICAID

## 2024-03-12 ENCOUNTER — TELEPHONE (OUTPATIENT)
Dept: CARDIOLOGY | Facility: CLINIC | Age: 24
End: 2024-03-12
Payer: MEDICAID

## 2024-03-12 NOTE — TELEPHONE ENCOUNTER
Attempt to schedule appointment with Dr. Mitchell, no voicemail set up at this.   ----- Message from Karina Woo RN sent at 3/12/2024 12:46 PM CDT -----  Regarding: Returning your call  Contact: [phone]    ----- Message -----  From: Manuel Hutchinson MA  Sent: 3/12/2024  11:40 AM CDT  To: [facility]    Pt called                  In regards to needing to speak back with ms Jacque mitchell.

## 2024-04-01 ENCOUNTER — TELEPHONE (OUTPATIENT)
Dept: PEDIATRIC CARDIOLOGY | Facility: CLINIC | Age: 24
End: 2024-04-01
Payer: MEDICAID

## 2024-04-01 NOTE — TELEPHONE ENCOUNTER
Daren had 2 VUS on his genetic testing. His aunt, Roberta, was tested and was postive for one for one of the  Variant of Uncertain Significance in c.2522C>T (p.[id number]), was identified in LAMA4. The LAMA4 gene is associated with dilated cardiomyopathy ("[employer]" UID: 134140).     Daren's mother (Roberta's sister) has a history dilated cardiomyopathy associated with eccentric hypertrophy and an ejection fraction of 10% per echocardiogram report from 6/25/10. She had ventricular tachycardia s/p defibrillator/pacemaker. She was initially diagnosed in 2008 after fainting in [place]. his mother has since passed away.  His MGM  (Roberta's mom) also reported passed from similar issues. Roberta  was recently diagnosed with heart failure.      Spoke to Roberta 4/3/2024 . Faxed info to her cardiologist, Dr. Carter,  who is managing her heart failure so they can be aware of her VUS.Spoke to his office staff and updated them with he results. Discussed she will need continued follow up with cardiology for management.

## 2024-04-08 ENCOUNTER — OFFICE VISIT (OUTPATIENT)
Dept: PEDIATRIC CARDIOLOGY | Facility: CLINIC | Age: 24
End: 2024-04-08
Payer: MEDICAID

## 2024-04-08 VITALS
DIASTOLIC BLOOD PRESSURE: 62 MMHG | HEIGHT: 69 IN | RESPIRATION RATE: 18 BRPM | BODY MASS INDEX: 28.87 KG/M2 | SYSTOLIC BLOOD PRESSURE: 118 MMHG | WEIGHT: 194.88 LBS | OXYGEN SATURATION: 99 % | HEART RATE: 61 BPM

## 2024-04-08 DIAGNOSIS — I35.1 AORTIC VALVE INSUFFICIENCY, ETIOLOGY OF CARDIAC VALVE DISEASE UNSPECIFIED: ICD-10-CM

## 2024-04-08 DIAGNOSIS — Z82.49 FAMILY HISTORY OF CARDIOMYOPATHY: ICD-10-CM

## 2024-04-08 DIAGNOSIS — R94.31 ABNORMAL EKG: ICD-10-CM

## 2024-04-08 DIAGNOSIS — I42.8 LV NON-COMPACTION CARDIOMYOPATHY: ICD-10-CM

## 2024-04-08 DIAGNOSIS — Z82.49 FAMILY HISTORY OF AICD (AUTOMATIC INTERNAL CARDIAC DEFIBRILLATOR): ICD-10-CM

## 2024-04-08 DIAGNOSIS — R93.1 ABNORMAL ECHOCARDIOGRAM: ICD-10-CM

## 2024-04-08 DIAGNOSIS — R09.89 DECREASED CARDIAC FUNCTION: ICD-10-CM

## 2024-04-08 DIAGNOSIS — Z91.89 AT HIGH RISK FOR CARDIAC ARRHYTHMIA: ICD-10-CM

## 2024-04-08 DIAGNOSIS — I50.89 OTHER HEART FAILURE: ICD-10-CM

## 2024-04-08 LAB
OHS QRS DURATION: 90 MS
OHS QTC CALCULATION: 416 MS

## 2024-04-08 PROCEDURE — 1160F RVW MEDS BY RX/DR IN RCRD: CPT | Mod: CPTII,S$GLB,, | Performed by: PHYSICIAN ASSISTANT

## 2024-04-08 PROCEDURE — 3008F BODY MASS INDEX DOCD: CPT | Mod: CPTII,S$GLB,, | Performed by: PHYSICIAN ASSISTANT

## 2024-04-08 PROCEDURE — 4010F ACE/ARB THERAPY RXD/TAKEN: CPT | Mod: CPTII,S$GLB,, | Performed by: PHYSICIAN ASSISTANT

## 2024-04-08 PROCEDURE — 1159F MED LIST DOCD IN RCRD: CPT | Mod: CPTII,S$GLB,, | Performed by: PHYSICIAN ASSISTANT

## 2024-04-08 PROCEDURE — 3074F SYST BP LT 130 MM HG: CPT | Mod: CPTII,S$GLB,, | Performed by: PHYSICIAN ASSISTANT

## 2024-04-08 PROCEDURE — 93000 ELECTROCARDIOGRAM COMPLETE: CPT | Mod: S$GLB,,, | Performed by: PEDIATRICS

## 2024-04-08 PROCEDURE — 3078F DIAST BP <80 MM HG: CPT | Mod: CPTII,S$GLB,, | Performed by: PHYSICIAN ASSISTANT

## 2024-04-08 PROCEDURE — 99214 OFFICE O/P EST MOD 30 MIN: CPT | Mod: 25,S$GLB,, | Performed by: PHYSICIAN ASSISTANT

## 2024-04-08 RX ORDER — ENALAPRIL MALEATE 5 MG/1
5 TABLET ORAL DAILY
Qty: 90 TABLET | Refills: 3 | Status: SHIPPED | OUTPATIENT
Start: 2024-04-08 | End: 2025-04-08

## 2024-04-08 NOTE — PATIENT INSTRUCTIONS
Sesar Crowell MD  Pediatric Cardiology  [address] [place] [address]  [phone]    General Guidelines    Name: Daren Silva                   : 2000    Diagnosis:   1. LV non-compaction cardiomyopathy    2. Decreased cardiac function    3. Abnormal EKG    4. Other heart failure    5. Aortic valve insufficiency, etiology of cardiac valve disease unspecified    6. At high risk for cardiac arrhythmia    7. Abnormal echocardiogram    8. Family history of cardiomyopathy    9. Family history of AICD (automatic internal cardiac defibrillator)        PCP: Tom Blevins MD  PCP Phone Number: [phone]    If you have an emergency or you think you have an emergency, go to the nearest emergency room!     Breathing too fast, doesnt look right, consistently not eating well, your child needs to be checked. These are general indications that your child is not feeling well. This may be caused by anything, a stomach virus, an ear ache or heart disease, so please call Tom Blevins MD. If Tom Blevins MD thinks you need to be checked for your heart, they will let us know.     If your child experiences a rapid or very slow heart rate and has the following symptoms, call Tom Blevins MD or go to the nearest emergency room.   unexplained chest pain   does not look right   feels like they are going to pass out   actually passes out for unexplained reasons   weakness or fatigue   shortness of breath  or breathing fast   consistent poor feeding     If your child experiences a rapid or very slow heart rate that lasts longer than 30 minutes call Tom Blevins MD or go to the nearest emergency room.     If your child feels like they are going to pass out - have them sit down or lay down immediately. Raise the feet above the head (prop the feet on a chair or the wall) until the feeling passes. Slowly allow the child to sit, then stand. If the feeling returns, lay back down and  start over.     It is very important that you notify Tom Blevins MD first. Tom Blevins MD or the ER Physician can reach Dr. Sesar Crowell at the office or through [facility] at [phone] as needed.    Call our office ([phone]) one week after ALL tests for results.     For appointments with Dr. Mitchell, please call [phone]

## 2024-04-08 NOTE — PROGRESS NOTES
[facility]  Daren Silva  2000    Daren Silva is a 24 y.o. male presenting for follow-up of    LV non-compaction cardiomyopathy    Decreased cardiac function    Abnormal EKG    Family history of AICD (automatic internal cardiac defibrillator)    Family history of cardiomyopathy    Abnormal echocardiogram    At high risk for cardiac arrhythmia    Aortic valve regurgitation    Other heart failure    Family history of ventricular tachycardia     HPI  Daren Silva was initially sent for cardiac evaluation in January 2014 for abnormal echo findings following a sports physical. Echo revealed LV noncompaction. Of note, his mother has a history dilated cardiomyopathy associated with eccentric hypertrophy and an ejection fraction of 10% per echocardiogram report from 6/25/10. She had ventricular tachycardia s/p defibrillator/pacemaker. She was initially diagnosed in 2008 after fainting in [employer]. his mother has since passed away. His MGM also reported passed from similar issues. He reports his maternal aunt was recently diagnosed with heart failure.  Daren underwent genetic testing. 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     1)A Variant of Uncertain Significance, c.2522C>T (p.[id number]), was identified in LAMA4. The LAMA4 gene is associated with dilated cardiomyopathy (MedGen UID: 500566). Not all variants present in a gene cause disease. The clinical significance of the variant(s) identified in this gene is uncertain. Until this uncertainty can be resolved, caution should be exercised before using this result to inform clinical management decisions.      2) A Variant of Uncertain Significance, c.5803G>A (p.[id number]), was identified in SCN5A. The SCN5A gene is associated with autosomal dominant Brugada syndrome (BrS) (MedGen UID: 143041), long QT syndrome (LQTS), type 3 (MedGen UID: 546644), dilated cardiomyopathy (DCM) (MedGen UID: 264700) and atrial fibrillation (MedGen UID: 843161) and more  severe, early-onset autosomal recessive conditions (MedGen UID: 454683, PMID: 96481297, 16470229, 92698453, 68409998, 61107589). Other NGQ5Tygmwjwz conditions have been reported (OMIM: 919196). Not all variants present in a gene cause disease. The clinical significance of the variant(s) identified in this gene is uncertain. Until this uncertainty can be resolved, caution should be exercised before using this result to inform clinical management decisions.    His maternal aunt who is followed by Dr. Carter for heart failure is postive for the VUS in c.2522C>T (p.[id number]) which is assoicated with dilated cardiomyopathy. Genetic testing on his son is pending.      His studies had been evaluated by several cardiologists (Dr. Samano, Dr. Clarke, Dr. Gabriel, Dr. Yuen, Dr. Forde) and all agreed that he has LV non-compaction. At that time, he was living part time with his father in [place] who wanted him to play sports. He was last seen at [facility] in 2016 where is was again restricted from sports. He reports he continued to play football without any issues.      In 2017 he underwent cardiac MRI at [facility] that showed non-compacted to compacted myocardium ratio > 2.3 with multiple crypts consistent with LV non-compaction with LVEF of  40 %. Stress echo at that time showed moderately depressed left ventricular systolic function (EF 30-35%) but no evidence of stress induced myocardial ischemia. He was discussed at the Pediatric CV Surgery and Cardiology Conference. The consensus from the group, pertaining to his asymptomatic heart failure, is that he should be on an ACE and beta blocker. Also recommended another echo  to revaluate his function, since there was such a discrepancy between his echo, MRI, and stress echo. Recommended no sports or competitive activities.  He saw Dr. Yuen in 2017 who planned to consider ICD for primary prevention. He has not been seen by any cardiologist since 2017.  He reports he stopped Enalapril, Atenolol, and ASA around that time.      He stopped his cardiac medications on his own in 2017. There was a lapse in care from 2017 until 2023 when he returned after noted to have high blood pressure at work physical. At his visit here he reported he had lost his wallet with his ID and insurance info.  Because of very limited echo availably, unsure of insurance coverage, and since it has been over 5 years since his last echo, Dr. Crowell did a limited echo at no charge  with him at the bedside to check his LVEF to make sure he did not need to be urgently admitted  to the hospital. His LVEF was decreased but stable from previous echo and was about 48%. He wanted to come back in a few days for an echo, holter, and genetic testing once he was able to obtain his insurance info and obtain a new ID. However, he never returned until 1/23/24 No murmur noted on exam.  He was restarted on Enalapril 5 mg daily and 81 mg ASA.  Holter was ordered that showed 1 episode of accelerated idioventricular rhythm HR~100 bpm and brief atrial run.  Echo showed: Reduced left ventricular systolic function, MM FS 26.3%, Magdaleno's biplane EF 44%. Chin method of measuring trabeculations positive for LNNC (0.3, positive is ratio < 0.5). He was instructed to follow up with Dr. Mitchell but missed his appointment. [place]. However, he is now working in [place] and states it is easier to make appointments.      Daren has been doing well since last visit. He is compliant with medication. No change in his endurance. Denies any recent illness, surgeries, or hospitalizations.    There are no reports of chest pain, chest pain with exertion, cyanosis, exercise intolerance, dyspnea, palpitations, syncope, and tachypnea. No other cardiovascular or medical concerns are reported.      Medications:   Medication List with Changes/Refills   Current Medications    ASPIRIN 81 MG CHEW     Take 1 tablet (81 mg total) by mouth once daily.   Changed and/or Refilled Medications    Modified Medication Previous Medication    ENALAPRIL (VASOTEC) 5 MG TABLET enalapril (VASOTEC) 5 MG tablet       Take 1 tablet (5 mg total) by mouth once daily.    Take 1 tablet (5 mg total) by mouth once daily.      Allergies: Review of patient's allergies indicates:  No Known Allergies  Family History   Problem Relation Age of Onset    Cardiomyopathy Mother         dilated?    Early death Mother     Pacemaker/defibrilator Mother     Hypertension Mother     Arrhythmia Mother         V-tach    Heart failure Mother     Stroke Mother     No Known Problems Father     No Known Problems Sister     No Known Problems Brother     No Known Problems Brother     Cardiomyopathy Maternal Aunt         possible?    Heart failure Maternal Aunt     Heart failure Maternal Grandmother     Cardiomyopathy Maternal Grandmother     Heart attacks under age 50 Maternal Grandmother          of heart attack at 41    Cancer Maternal Grandfather     No Known Problems Paternal Grandmother     No Known Problems Paternal Grandfather     Anemia Neg Hx     Clotting disorder Neg Hx     Childhood respiratory disease Neg Hx     Congenital heart disease Neg Hx     Deafness Neg Hx     Long QT syndrome Neg Hx     Premature birth Neg Hx     Seizures Neg Hx     SIDS Neg Hx      Past Medical History:   Diagnosis Date    Abnormal EKG     Aortic insufficiency     Chlamydia trachomatis infection 01/15/2020    Decreased cardiac function     Family history of AICD (automatic internal cardiac defibrillator)     Mother: ventricular tachycardia s/p defibrillator/pacemaker    Family history of cardiomyopathy     Mother: history dilated cardiomyopathy associated with eccentric hypertrophy and an ejection fraction of 10% per echo    Gonorrhea 2021    LV non-compaction cardiomyopathy      Social History     Social History Narrative    Daren is currently working.  "Daren likes to chill and play video games.      Past Surgical History:   Procedure Laterality Date    NO PAST SURGERIES       Birth History    Birth     Weight: 2.268 kg (5 lb)    Gestation Age: 37 wks    Days in Hospital: 2.0       There is no immunization history on file for this patient.  Immunizations were reviewed today and if not current, recommend follow up with the PCP for further management.  Past medical history, family history, surgical history, social history updated and reviewed today.     Review of Systems  GENERAL: No fever, chills, fatigability, malaise, or weight loss.  CHEST: Denies SEXTON, cyanosis, wheezing, cough, sputum production, or SOB.  CARDIOVASCULAR: Denies chest pain, palpitations, diaphoresis, SOB, or reduced exercise tolerance.  Endocrine: Denies polyphagia, polydipsia, or polyuria  Skin: Denies rashes or color change  HENT: Negative for congestion, headaches and sore throat.   ABDOMEN: Appetite fine. No weight loss. Denies diarrhea, abdominal pain, nausea, or vomiting.  PERIPHERAL VASCULAR: No edema, varicosities, or cyanosis.  Musculoskeletal: Negative for muscle weakness and stiffness.  NEUROLOGIC: no dizziness, no history of syncope by report, no headache   Psychiatric/Behavioral: Negative for altered mental status. The patient is not nervous/anxious.   Allergic/Immunologic: Negative for environmental allergies.   : dysuria, hematuria, polyuria    Objective:   /62 (BP Location: Right arm, Patient Position: Sitting, BP Method: Medium (Manual))   Pulse 61   Resp 18   Ht 5' 9" (1.753 m)   Wt 88.4 kg (194 lb 14.2 oz)   SpO2 99%   BMI 28.78 kg/m²   Body surface area is 2.07 meters squared.  Growth %ile SmartLinks can only be used for patients less than 20 years old.    Physical Exam  GENERAL: Awake, well-developed well-nourished, no apparent distress  HEENT: mucous membranes moist and pink, normocephalic, no cranial or carotid bruits, sclera anicteric  NECK:  no " lymphadenopathy  CHEST: Good air movement, clear to auscultation bilaterally  CARDIOVASCULAR: Quiet precordium, regular rate and rhythm, single S1, split S2, normal P2, No S3 or S4, no rubs or gallops. No clicks or rumbles. No cardiomegaly by palpation. No murmur noted  ABDOMEN: Soft, nontender nondistended, no hepatosplenomegaly, no aortic bruits  EXTREMITIES: Warm well perfused, 2+ radial/pedal/femoral pulses, capillary refill 2 seconds, no clubbing, cyanosis, or edema  NEURO: Alert and oriented, cooperative with exam, face symmetric, moves all extremities well.  Skin: pink, turgor WNL  Vitals reviewed     Tests:   Today's EKG interpretation by Dr. Crowell reveals:   Normal sinus rhythm with sinus arrhythmia   Nonspecific T wave abnormality   Abnormal ECG   (Final report in electronic medical record)      Echocardiogram:   Pertinent echocardiographic findings from the echo dated 1/23/24 are:   There are 4 chambers with normally aligned great vessels. Chamber sizes are qualitatively normal. There are no shunts noted. There is no organic obstruction noted. Physiological TR, PI, MR. The right coronary artery and left coronary are patent by 2D. Reduced left ventricular systolic function, MM FS 26.3%, Magdaleno's biplane EF 44%. Chin method of measuring trabeculations positive for LNNC (0.3, positive is ratio < 0.5)) LA volume 22 ml/m2, normal size LV lateral tissue doppler WNL TAPSE 2.4 cm Descending aorta 7 mmHg Refer to Dr. Fan Mitchell Follow up recommended Clinical correlation suggested   (Full report in electronic medical record)    Holter 1/23/24   Conclusion    Holter RX: 7 Days  Holter Duration: 7 Days 2 Hours  Analysis Time: 7 Days  Major Rhythm is NSR  1 episode of accelerated idioventricular rhythm HR~100  Ectopy burden insignificant   Brief atrial run  No long pauses, CHB, or PSVT.  Clinical Correlation  Review with chart and midlevel     Stress ECHO: 9/7/17  CONCLUSIONS     1 - Moderately depressed left  ventricular systolic function (EF 30-35%).     2 - Normal right ventricular systolic function .     3 - Indeterminate LV diastolic function.     4 - Noncompaction cardiomyopathy.   No evidence of stress induced myocardial ischemia.      Cardiac MRI: 9/15/17   Conclusion:    1. The left ventricular volumes are increased. Non-compacted to compacted myocardium ratio > 2.3 with multiple crypts consistent with LV non-compaction. Mild global hypokinesis. The calculated LVEF is 40 %.   2. The right ventricular end diastolic volume is increased.  RVEF of 39 %.   3. Top normal left atrial size.   4. Trivial aortic valve insufficiency, regurgitant fraction of 1%.     5. Negative delayed hyperenhancement.          A Variant of Uncertain Significance, c.2522C>T (p.[id number]), was identified in LAMA4. The LAMA4 gene is associated with dilated cardiomyopathy (MedGen UID: 304479). Not all variants present in a gene cause disease. The clinical significance of the variant(s) identified in this gene is uncertain. Until this uncertainty can be resolved, caution should be exercised before using this result to inform clinical management decisions. Complimentary familial VUS testing is not offered.         A Variant of Uncertain Significance, c.5803G>A (p.[id number]), was identified in SCN5A. The SCN5A gene is associated with autosomal dominant Brugada syndrome (BrS) (MedGen UID: 058670), long QT syndrome (LQTS), type 3 (MedGen UID: 653972), dilated cardiomyopathy (DCM) (MedGen UID: 163943) and atrial fibrillation (MedGen UID: 246333) and more severe, early-onset autosomal recessive conditions (MedGen UID: 048387, PMID: 12643990, 99319826, 72105638, 96333586, 42722998). Other GHB3Haqubivw conditions have been reported (OMIM: 794980). Not all variants present in a gene cause disease. The clinical significance of the variant(s) identified in this gene is uncertain. Until this uncertainty can be resolved, caution should be exercised before  using this result to inform clinical management decisions. This variant qualifies for complimentary family studies as part of our VUS Resolution Program. Familial VUS testing is recommended if informative family members are available and are likely to provide additional evidence for future variant reclassification. Details on our VUS Resolution Program can be found at [email or web address].     Testing of up to two family members for the Variant(s) of Uncertain Significance (VUS) identified in SCN5A is available at no additional cost.    Assessment:  Patient Active Problem List   Diagnosis    LV non-compaction cardiomyopathy    Decreased cardiac function    Abnormal EKG    Family history of AICD (automatic internal cardiac defibrillator)    Family history of cardiomyopathy    Abnormal echocardiogram    At high risk for cardiac arrhythmia    Aortic valve regurgitation    Other heart failure    Family history of ventricular tachycardia       Discussion/ Plan:   Dr. Crowell reviewed history and physical exam. He then performed the physical exam. He discussed the findings with the patient's caregiver(s), and answered all questions. Dr. Crowell and I have reviewed our general guidelines related to cardiac issues with the family.  I instructed them in the event of an emergency to call 911 or go to the nearest emergency room.  They know to contact the PCP if problems arise or if they are in doubt.    Daren is followed for  LV noncompaction cardiomyopathy with moderate to severely depressed LV systolic dysfunction but no overt symptoms at present. Genetic testing showed  two VUS assoicated with dilated cardiomyopathy.  There has been a lapse in care from 2017 when he stopped his cardiac medications. He returned in 2023 but did not complete any testing or return for follow up. He now has insurance and is agreeable with maintaining appropriate follow up and taking his medications.  He is now working in the area and  states is it easier for him to follow up. His past MRI showed AI. Recent echo 1/23/24 with LVEF 44%. He was restarted on Enalapril and 81 mg ASA in January and will continue those.  Will transition him to Dr. Mitchell. He will see Dr. King MANCERA since he will be followed by Dr. Mitchell. Discussed signs and symptoms to alert us about. He should go to the ER if there are any signs of distress.     His maternal aunt who is followed by Dr. Carter for heart failure is postive for the VUS in c.2522C>T (p.[id number]) which is assoicated with dilated cardiomyopathy. Genetic testing on his son is pending. We will see his son with an echo in the near future.     Holter was ordered that showed 1 episode of accelerated idioventricular rhythm HR~100 bpm and brief atrial run.  No intervention at his time. Dysrhythmia precautions should be followed. Discussed signs and symptoms to alert us about and go to the ER if in distress.     Activity:NO strenuous activities, no heavy weight lifting, no competitive sports/activities.       Selective endocarditis prophylaxis is recommended in this circumstance.      Medications:   Medication List with Changes/Refills   Current Medications    ASPIRIN 81 MG CHEW    Take 1 tablet (81 mg total) by mouth once daily.   Changed and/or Refilled Medications    Modified Medication Previous Medication    ENALAPRIL (VASOTEC) 5 MG TABLET enalapril (VASOTEC) 5 MG tablet       Take 1 tablet (5 mg total) by mouth once daily.    Take 1 tablet (5 mg total) by mouth once daily.         Orders placed this encounter  No orders of the defined types were placed in this encounter.      Follow-Up:   Return to clinic with Dr. Mitchell in the near future  or sooner if there are any concerns. Will see Dr. PRN since followed by Dr. Mitchell.     Sincerely,  Sesar Crowell MD    Note Contributing Authors:  MD Swathi Urbina PA-C  04/08/2024    Attestation: Sesar Crowell MD  I have reviewed the records and agree with the  above. I have examined the patient and discussed the findings with the family in attendance. All questions were answered to their satisfaction. I agree with the plan and the follow up instructions.

## 2024-04-09 ENCOUNTER — PATIENT MESSAGE (OUTPATIENT)
Dept: CARDIOLOGY | Facility: CLINIC | Age: 24
End: 2024-04-09
Payer: MEDICAID

## 2024-04-09 ENCOUNTER — TELEPHONE (OUTPATIENT)
Dept: CARDIOLOGY | Facility: CLINIC | Age: 24
End: 2024-04-09
Payer: MEDICAID

## 2024-04-09 NOTE — TELEPHONE ENCOUNTER
Appt moved to 3:30 PM, no voicemail set up at this time.     ----- Message from Abhinav Maciel MA sent at 4/9/2024 11:35 AM CDT -----  Patient is returning a phone call.    Who left a message for the patient: Jacque    Does patient know what this is regarding: Appt on 04/22    Would you like a call back, or a response through your [facility] portal?: Daren at [phone]      Comments: Want to know if he can get a afternoon appointment due to being on probation at work and can not miss another day. He gets off work at 3pm.

## 2024-04-16 ENCOUNTER — TELEPHONE (OUTPATIENT)
Dept: PEDIATRIC CARDIOLOGY | Facility: CLINIC | Age: 24
End: 2024-04-16
Payer: MEDICAID

## 2024-04-16 NOTE — TELEPHONE ENCOUNTER
Reviewed with Dr. Crowell. Letter in chart.     ----- Message from Liya Walters MA sent at 4/16/2024  8:08 AM CDT -----  Regarding: letter for work  Daren stopped by the clinic this morning and asked for a letter for work with his diagnosis and any restrictions  that he may have. 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 He said he will wait in his truck.     [phone]

## 2024-04-22 ENCOUNTER — OFFICE VISIT (OUTPATIENT)
Dept: PEDIATRIC CARDIOLOGY | Facility: CLINIC | Age: 24
End: 2024-04-22
Payer: MEDICAID

## 2024-04-22 DIAGNOSIS — I42.8 LV NON-COMPACTION CARDIOMYOPATHY: Primary | ICD-10-CM

## 2024-04-22 DIAGNOSIS — Z82.49 FAMILY HISTORY OF CARDIOMYOPATHY: ICD-10-CM

## 2024-04-22 PROBLEM — R94.31 ABNORMAL EKG: Status: RESOLVED | Noted: 2017-12-10 | Resolved: 2024-04-22

## 2024-04-22 PROBLEM — R93.1 ABNORMAL ECHOCARDIOGRAM: Status: RESOLVED | Noted: 2023-01-06 | Resolved: 2024-04-22

## 2024-04-22 PROCEDURE — 99214 OFFICE O/P EST MOD 30 MIN: CPT | Mod: 95,,, | Performed by: PEDIATRICS

## 2024-04-22 PROCEDURE — 4010F ACE/ARB THERAPY RXD/TAKEN: CPT | Mod: CPTII,95,, | Performed by: PEDIATRICS

## 2024-04-22 NOTE — PROGRESS NOTES
2024    re:Daren Silva  :2000    Tom Blevins MD  [address] [facility] [id number] [facility]     The patient location is: work  The chief complaint leading to consultation is: cardiomyopathy    Visit type: audiovisual    Face to Face time with patient: 10 min  40 minutes of total time spent on the encounter, which includes face to face time and non-face to face time preparing to see the patient (eg, review of tests), Obtaining and/or reviewing separately obtained history, Documenting clinical information in the electronic or other health record, Independently interpreting results (not separately reported) and communicating results to the patient/family/caregiver, or Care coordination (not separately reported).         Each patient to whom he or she provides medical services by telemedicine is:  (1) informed of the relationship between the physician and patient and the respective role of any other health care provider with respect to management of the patient; and (2) notified that he or she may decline to receive medical services by telemedicine and may withdraw from such care at any time.    Notes:       Dear Dr. Blevins:    Daren Silva is a 24 y.o. male seen in my [facility] clinic today for evaluation of cardiomyopathy.  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  Once I see the blood work, I will get him referred over to the team at [facility].    Discussion:  He has unchanged mild to moderate left  ventricular dysfunction with an ejection fraction in the 40-45% range.  He is asymptomatic.  For long-term management of his cardiomyopathy, it is best that he have a local adult cardiologist.  He does not benefit from long-term follow-up in the [facility].  He is well aware of the fact that he could some day need a defibrillator or even consideration of a heart transplant.  However, I am encouraged that he has done well for so long.    History of present illness:  Seen he had been lost to follow-up with no medical therapy from  until , when he was noted to have high blood pressure on a work physical.  He reports good compliance with his aspirin and his enalapril.  He denies any symptoms related to the cardiovascular system.  No chest pain, palpitations, syncope, near syncope, cyanosis, or edema.  He works full-time.  No problems completing his job duties.    By report, mother had a dilated cardiomyopathy with ejection fraction 10% and a history of a defibrillator.  Maternal grandmother, by report,  from similar issues.    Genetic testing revealed:  1)A Variant of Uncertain Significance, c.2522C>T (p.[id number]), was identified in LAMA4. The LAMA4 gene is associated with dilated cardiomyopathy (MedGen UID: 990553). Not all variants present in a gene cause disease. The clinical significance of the variant(s) identified in this gene is uncertain. Until this uncertainty can be resolved, caution should be exercised before using this result to inform clinical management decisions.      2) A Variant of Uncertain Significance, c.5803G>A (p.[id number]), was identified in SCN5A. The SCN5A gene is associated with autosomal dominant Brugada syndrome (BrS) (MedGen UID: 007667), long QT syndrome (LQTS), type 3 (MedGen UID: 758798), dilated cardiomyopathy (DCM) (MedGen UID: 769887) and atrial fibrillation (MedGen UID: 934341) and more severe, early-onset autosomal recessive conditions (MedGen  "UID: 444587, PMID: 61806695, 42530024, 16775673, 80811145, 52896359). Other [id number] conditions have been reported (OMIM: 775379). Not all variants present in a gene cause disease. The clinical significance of the variant(s) identified in this gene is uncertain. Until this uncertainty can be resolved, caution should be exercised before using this result to inform clinical management decisions.     His maternal aunt who is followed by Dr. Carter for heart failure is postive for the VUS in c.2522C>T (p.[id number]) which is assoicated with dilated cardiomyopathy.    In 2017 he underwent cardiac MRI at [facility] that showed non-compacted to compacted myocardium ratio > 2.3 with multiple crypts consistent with LV non-compaction with LVEF of  40 %. Stress echo at that time showed moderately depressed left ventricular systolic function (EF 30-35%) but no evidence of stress induced myocardial ischemia. He was discussed at the Pediatric CV Surgery and Cardiology Conference.     The review of systems is as noted above. It is otherwise negative for other symptoms related to the general, neurological, psychiatric, endocrine, gastrointestinal, genitourinary, respiratory, dermatologic, musculoskeletal, hematologic, and immunologic systems.     4/8/2024   Vitals - 1 value per visit    SYSTOLIC 118    DIASTOLIC 62    Pulse 61    Resp 18    SPO2 99 %    Weight (lb) 194.89    Weight (kg) 88.4    Height 5' 9" (1.753 m)    BMI (Calculated) 28.8        In general, he is a very healthy-appearing nondysmorphic male in no apparent distress.      I personally reviewed the following tests performed today and my interpretation follows:    Echo 1/23/24:  There are 4 chambers with normally aligned great vessels. Chamber sizes are qualitatively normal. There are no shunts noted. There is no organic obstruction noted. Physiological TR, PI, MR. The right coronary artery and left coronary are patent by 2D. Reduced left ventricular " systolic function, MM FS 26.3%, Magdaleno's biplane EF 44%. Chin method of measuring trabeculations positive for LNNC (0.3, positive is ratio < 0.5)) LA volume 22 ml/m2, normal size LV lateral tissue doppler WNL TAPSE 2.4 cm Descending aorta 7 mmHg Refer to Dr. Fan Mitchell Follow up recommended Clinical correlation suggested.   Holter 1/23/24:  Major Rhythm is NSR  1 episode of accelerated idioventricular rhythm HR~100  Ectopy burden insignificant   Brief atrial run  No long pauses, CHB, or PSVT.  Clinical Correlation  Review with chart and midlevel.    Thank you for referring this patient to our clinic.  Please call with any questions.    Sincerely,        Kiko Mitchell MD  Pediatric Cardiology  Adult Congenital Heart Disease  Pediatric Heart Failure and Transplantation  [facility] [address] [place]  [address]  [phone]

## 2024-04-24 ENCOUNTER — TELEPHONE (OUTPATIENT)
Dept: CARDIOLOGY | Facility: CLINIC | Age: 24
End: 2024-04-24
Payer: MEDICAID